# Patient Record
Sex: MALE | Race: WHITE
[De-identification: names, ages, dates, MRNs, and addresses within clinical notes are randomized per-mention and may not be internally consistent; named-entity substitution may affect disease eponyms.]

---

## 2017-01-03 ENCOUNTER — HOSPITAL ENCOUNTER (OUTPATIENT)
Dept: HOSPITAL 45 - C.LABFOXDH | Age: 82
Discharge: HOME | End: 2017-01-03
Attending: INTERNAL MEDICINE
Payer: COMMERCIAL

## 2017-01-03 DIAGNOSIS — I48.2: Primary | ICD-10-CM

## 2017-01-03 LAB
INR PPP: 3.1 (ref 0.9–1.1)
PROTHROMBIN TIME: 34.8 SECONDS (ref 9–12)

## 2017-01-24 ENCOUNTER — HOSPITAL ENCOUNTER (OUTPATIENT)
Dept: HOSPITAL 45 - C.LABFOXDH | Age: 82
Discharge: HOME | End: 2017-01-24
Attending: INTERNAL MEDICINE
Payer: COMMERCIAL

## 2017-01-24 DIAGNOSIS — I48.2: Primary | ICD-10-CM

## 2017-01-24 LAB
INR PPP: 3.7 (ref 0.9–1.1)
PROTHROMBIN TIME: 42.3 SECONDS (ref 9–12)

## 2017-02-07 ENCOUNTER — HOSPITAL ENCOUNTER (OUTPATIENT)
Dept: HOSPITAL 45 - C.LABFOXDH | Age: 82
Discharge: HOME | End: 2017-02-07
Attending: INTERNAL MEDICINE
Payer: COMMERCIAL

## 2017-02-07 DIAGNOSIS — I48.2: Primary | ICD-10-CM

## 2017-02-07 LAB
INR PPP: 3 (ref 0.9–1.1)
PROTHROMBIN TIME: 33.5 SECONDS (ref 9–12)

## 2017-02-28 ENCOUNTER — HOSPITAL ENCOUNTER (OUTPATIENT)
Dept: HOSPITAL 45 - C.LABFOXDH | Age: 82
Discharge: HOME | End: 2017-02-28
Attending: INTERNAL MEDICINE
Payer: COMMERCIAL

## 2017-02-28 DIAGNOSIS — I48.91: Primary | ICD-10-CM

## 2017-02-28 LAB
INR PPP: 2.5 (ref 0.9–1.1)
PROTHROMBIN TIME: 27.5 SECONDS (ref 9–12)

## 2017-03-27 ENCOUNTER — HOSPITAL ENCOUNTER (OUTPATIENT)
Dept: HOSPITAL 45 - C.LABFOXDH | Age: 82
Discharge: HOME | End: 2017-03-27
Attending: INTERNAL MEDICINE
Payer: COMMERCIAL

## 2017-03-27 DIAGNOSIS — I48.91: Primary | ICD-10-CM

## 2017-03-27 LAB
INR PPP: 2.4 (ref 0.9–1.1)
PROTHROMBIN TIME: 27 SECONDS (ref 9–12)

## 2017-04-24 ENCOUNTER — HOSPITAL ENCOUNTER (OUTPATIENT)
Dept: HOSPITAL 45 - C.LABFOXDH | Age: 82
Discharge: HOME | End: 2017-04-24
Attending: INTERNAL MEDICINE
Payer: COMMERCIAL

## 2017-04-24 DIAGNOSIS — I48.91: Primary | ICD-10-CM

## 2017-04-24 LAB
INR PPP: 2.3 (ref 0.9–1.1)
PARTIAL THROMBOPLASTIN RATIO: 1.4
PROTHROMBIN TIME: 25.3 SECONDS (ref 9–12)

## 2017-05-23 ENCOUNTER — HOSPITAL ENCOUNTER (OUTPATIENT)
Dept: HOSPITAL 45 - C.LABFOXDH | Age: 82
Discharge: HOME | End: 2017-05-23
Attending: INTERNAL MEDICINE
Payer: COMMERCIAL

## 2017-05-23 DIAGNOSIS — I48.91: Primary | ICD-10-CM

## 2017-05-23 LAB
ANION GAP SERPL CALC-SCNC: 7 MMOL/L (ref 3–11)
BUN SERPL-MCNC: 28 MG/DL (ref 7–18)
BUN/CREAT SERPL: 20.2 (ref 10–20)
CALCIUM SERPL-MCNC: 8.3 MG/DL (ref 8.5–10.1)
CHLORIDE SERPL-SCNC: 109 MMOL/L (ref 98–107)
CO2 SERPL-SCNC: 27 MMOL/L (ref 21–32)
CREAT SERPL-MCNC: 1.4 MG/DL (ref 0.6–1.4)
GLUCOSE SERPL-MCNC: 84 MG/DL (ref 70–99)
INR PPP: 2.8 (ref 0.9–1.1)
POTASSIUM SERPL-SCNC: 4.6 MMOL/L (ref 3.5–5.1)
PROTHROMBIN TIME: 31.7 SECONDS (ref 9–12)
SODIUM SERPL-SCNC: 143 MMOL/L (ref 136–145)

## 2017-06-19 ENCOUNTER — HOSPITAL ENCOUNTER (OUTPATIENT)
Dept: HOSPITAL 45 - C.LABFOXDH | Age: 82
Discharge: HOME | End: 2017-06-19
Attending: NURSE PRACTITIONER
Payer: COMMERCIAL

## 2017-06-19 DIAGNOSIS — I48.91: Primary | ICD-10-CM

## 2017-06-19 LAB
INR PPP: 2.5 (ref 0.9–1.1)
PROTHROMBIN TIME: 27.7 SECONDS (ref 9–12)

## 2017-07-03 ENCOUNTER — HOSPITAL ENCOUNTER (OUTPATIENT)
Dept: HOSPITAL 45 - C.LABFOXDH | Age: 82
Discharge: HOME | End: 2017-07-03
Attending: NURSE PRACTITIONER
Payer: COMMERCIAL

## 2017-07-03 DIAGNOSIS — I48.2: Primary | ICD-10-CM

## 2017-07-03 LAB
INR PPP: 2.8 (ref 0.9–1.1)
PROTHROMBIN TIME: 31.3 SECONDS (ref 9–12)

## 2017-07-05 NOTE — CODING QUERY NO DIAGNOSIS
SUPPORTING DIAGNOSIS NEEDED





A supporting diagnosis is required for the test/procedure performed on this patient in 
order for us to be reimbursed by the patient's insurance. Please provide a supporting 
diagnosis for the following test/procedure listed below next to the test name along with 
your signature. 



*If there is no additional diagnosis for this patient that would support the following 
test/procedure please document that below next to the test/procedure.



Test(s)/Procedure(s) that require a supporting diagnosis:









* PT/INR                          DIAGNOSIS:









Provider Signature:  ______________________________  Date:  _______



Thank you  

Nida Elkins

[employer]

Phone:  [phone]

Fax:  [phone]



Once completed, please kindly fax back to [phone]



For questions please call [phone]

## 2017-07-11 ENCOUNTER — HOSPITAL ENCOUNTER (OUTPATIENT)
Dept: HOSPITAL 45 - C.LABFOXDH | Age: 82
Discharge: HOME | End: 2017-07-11
Attending: INTERNAL MEDICINE
Payer: COMMERCIAL

## 2017-07-11 DIAGNOSIS — I48.91: Primary | ICD-10-CM

## 2017-07-11 LAB
INR PPP: 2.9 (ref 0.9–1.1)
PROTHROMBIN TIME: 32.6 SECONDS (ref 9–12)

## 2017-07-17 ENCOUNTER — HOSPITAL ENCOUNTER (OUTPATIENT)
Dept: HOSPITAL 45 - C.LABFOXDH | Age: 82
Discharge: HOME | End: 2017-07-17
Attending: INTERNAL MEDICINE
Payer: COMMERCIAL

## 2017-07-17 DIAGNOSIS — I48.91: Primary | ICD-10-CM

## 2017-07-17 LAB
INR PPP: 3.3 (ref 0.9–1.1)
PROTHROMBIN TIME: 36.7 SECONDS (ref 9–12)

## 2017-07-24 ENCOUNTER — HOSPITAL ENCOUNTER (OUTPATIENT)
Dept: HOSPITAL 45 - C.LABFOXDH | Age: 82
Discharge: HOME | End: 2017-07-24
Attending: INTERNAL MEDICINE
Payer: COMMERCIAL

## 2017-07-24 DIAGNOSIS — I48.91: Primary | ICD-10-CM

## 2017-07-24 LAB
INR PPP: 2.5 (ref 0.9–1.1)
PROTHROMBIN TIME: 27.4 SECONDS (ref 9–12)

## 2017-08-07 ENCOUNTER — HOSPITAL ENCOUNTER (OUTPATIENT)
Dept: HOSPITAL 45 - C.LABFOXDH | Age: 82
Discharge: HOME | End: 2017-08-07
Attending: NURSE PRACTITIONER
Payer: COMMERCIAL

## 2017-08-07 DIAGNOSIS — I48.91: Primary | ICD-10-CM

## 2017-08-07 LAB
INR PPP: 3.3 (ref 0.9–1.1)
PROTHROMBIN TIME: 37.4 SECONDS (ref 9–12)

## 2017-08-21 ENCOUNTER — HOSPITAL ENCOUNTER (OUTPATIENT)
Dept: HOSPITAL 45 - C.LABFOXDH | Age: 82
Discharge: HOME | End: 2017-08-21
Attending: INTERNAL MEDICINE
Payer: COMMERCIAL

## 2017-08-21 DIAGNOSIS — I48.91: Primary | ICD-10-CM

## 2017-08-21 LAB
INR PPP: 2.8 (ref 0.9–1.1)
PROTHROMBIN TIME: 31.7 SECONDS (ref 9–12)

## 2017-09-11 ENCOUNTER — HOSPITAL ENCOUNTER (OUTPATIENT)
Dept: HOSPITAL 45 - C.LABFOXDH | Age: 82
Discharge: HOME | End: 2017-09-11
Attending: INTERNAL MEDICINE
Payer: COMMERCIAL

## 2017-09-11 DIAGNOSIS — I48.91: Primary | ICD-10-CM

## 2017-09-11 LAB
INR PPP: 2.5 (ref 0.9–1.1)
PROTHROMBIN TIME: 27.4 SECONDS (ref 9–12)

## 2017-10-02 ENCOUNTER — HOSPITAL ENCOUNTER (OUTPATIENT)
Dept: HOSPITAL 45 - C.LABFOXDH | Age: 82
Discharge: HOME | End: 2017-10-02
Attending: INTERNAL MEDICINE
Payer: COMMERCIAL

## 2017-10-02 DIAGNOSIS — I48.91: Primary | ICD-10-CM

## 2017-10-02 LAB
INR PPP: 3.3 (ref 0.9–1.1)
PROTHROMBIN TIME: 37.5 SECONDS (ref 9–12)

## 2017-10-16 ENCOUNTER — HOSPITAL ENCOUNTER (OUTPATIENT)
Dept: HOSPITAL 45 - C.LABFOXDH | Age: 82
Discharge: HOME | End: 2017-10-16
Attending: INTERNAL MEDICINE
Payer: COMMERCIAL

## 2017-10-16 DIAGNOSIS — I48.91: Primary | ICD-10-CM

## 2017-10-16 LAB
INR PPP: 1.9 (ref 0.9–1.1)
PROTHROMBIN TIME: 20.5 SECONDS (ref 9–12)

## 2017-11-20 ENCOUNTER — HOSPITAL ENCOUNTER (OUTPATIENT)
Dept: HOSPITAL 45 - C.LABFOXDH | Age: 82
Discharge: HOME | End: 2017-11-20
Attending: INTERNAL MEDICINE
Payer: COMMERCIAL

## 2017-11-20 DIAGNOSIS — I48.91: Primary | ICD-10-CM

## 2017-11-20 LAB
INR PPP: 2.3 (ref 0.9–1.1)
PROTHROMBIN TIME: 25.4 SECONDS (ref 9–12)

## 2017-12-18 ENCOUNTER — HOSPITAL ENCOUNTER (OUTPATIENT)
Dept: HOSPITAL 45 - C.LABFOXDH | Age: 82
Discharge: HOME | End: 2017-12-18
Attending: INTERNAL MEDICINE
Payer: COMMERCIAL

## 2017-12-18 DIAGNOSIS — I48.91: Primary | ICD-10-CM

## 2017-12-18 LAB
INR PPP: 2.4 (ref 0.9–1.1)
PROTHROMBIN TIME: 24.8 SECONDS (ref 9–12)

## 2018-01-12 ENCOUNTER — HOSPITAL ENCOUNTER (OUTPATIENT)
Dept: HOSPITAL 45 - C.LABFOXDH | Age: 83
Discharge: HOME | End: 2018-01-12
Attending: INTERNAL MEDICINE
Payer: COMMERCIAL

## 2018-01-12 DIAGNOSIS — Z01.810: Primary | ICD-10-CM

## 2018-01-12 DIAGNOSIS — Z45.010: ICD-10-CM

## 2018-01-12 DIAGNOSIS — I48.2: ICD-10-CM

## 2018-01-12 DIAGNOSIS — I44.2: ICD-10-CM

## 2018-01-12 LAB
ALBUMIN SERPL-MCNC: 3.1 GM/DL (ref 3.4–5)
ALP SERPL-CCNC: 64 U/L (ref 45–117)
ALT SERPL-CCNC: 21 U/L (ref 12–78)
AST SERPL-CCNC: 14 U/L (ref 15–37)
BUN SERPL-MCNC: 24 MG/DL (ref 7–18)
CALCIUM SERPL-MCNC: 8.2 MG/DL (ref 8.5–10.1)
CO2 SERPL-SCNC: 24 MMOL/L (ref 21–32)
CREAT SERPL-MCNC: 1.35 MG/DL (ref 0.6–1.4)
EOSINOPHIL NFR BLD AUTO: 167 K/UL (ref 130–400)
GLUCOSE SERPL-MCNC: 99 MG/DL (ref 70–99)
HCT VFR BLD CALC: 33.9 % (ref 42–52)
HGB BLD-MCNC: 11 G/DL (ref 14–18)
INR PPP: 2.3 (ref 0.9–1.1)
MCH RBC QN AUTO: 30.6 PG (ref 25–34)
MCHC RBC AUTO-ENTMCNC: 32.4 G/DL (ref 32–36)
MCV RBC AUTO: 94.4 FL (ref 80–100)
PMV BLD AUTO: 10.8 FL (ref 7.4–10.4)
POTASSIUM SERPL-SCNC: 4.1 MMOL/L (ref 3.5–5.1)
PROT SERPL-MCNC: 5.7 GM/DL (ref 6.4–8.2)
RED CELL DISTRIBUTION WIDTH CV: 14.9 % (ref 11.5–14.5)
RED CELL DISTRIBUTION WIDTH SD: 51.2 FL (ref 36.4–46.3)
SODIUM SERPL-SCNC: 143 MMOL/L (ref 136–145)
WBC # BLD AUTO: 7.5 K/UL (ref 4.8–10.8)

## 2018-01-15 ENCOUNTER — HOSPITAL ENCOUNTER (OUTPATIENT)
Dept: HOSPITAL 45 - C.LABFOXDH | Age: 83
Discharge: HOME | End: 2018-01-15
Attending: INTERNAL MEDICINE
Payer: COMMERCIAL

## 2018-01-15 DIAGNOSIS — I48.91: Primary | ICD-10-CM

## 2018-01-15 LAB — INR PPP: 2.2 (ref 0.9–1.1)

## 2018-01-16 ENCOUNTER — HOSPITAL ENCOUNTER (OUTPATIENT)
Dept: HOSPITAL 45 - C.ACU | Age: 83
Discharge: HOME | End: 2018-01-16
Attending: INTERNAL MEDICINE
Payer: COMMERCIAL

## 2018-01-16 VITALS
BODY MASS INDEX: 20.15 KG/M2 | BODY MASS INDEX: 20.15 KG/M2 | WEIGHT: 134.48 LBS | HEIGHT: 68.5 IN | WEIGHT: 134.48 LBS | HEIGHT: 68.5 IN

## 2018-01-16 VITALS
DIASTOLIC BLOOD PRESSURE: 78 MMHG | SYSTOLIC BLOOD PRESSURE: 158 MMHG | HEART RATE: 66 BPM | OXYGEN SATURATION: 99 % | TEMPERATURE: 97.34 F

## 2018-01-16 VITALS
SYSTOLIC BLOOD PRESSURE: 150 MMHG | OXYGEN SATURATION: 98 % | DIASTOLIC BLOOD PRESSURE: 73 MMHG | TEMPERATURE: 97.34 F | HEART RATE: 60 BPM

## 2018-01-16 VITALS
OXYGEN SATURATION: 100 % | SYSTOLIC BLOOD PRESSURE: 152 MMHG | TEMPERATURE: 97.34 F | DIASTOLIC BLOOD PRESSURE: 76 MMHG | HEART RATE: 64 BPM

## 2018-01-16 VITALS — HEART RATE: 60 BPM | OXYGEN SATURATION: 98 % | DIASTOLIC BLOOD PRESSURE: 72 MMHG | SYSTOLIC BLOOD PRESSURE: 150 MMHG

## 2018-01-16 DIAGNOSIS — I10: ICD-10-CM

## 2018-01-16 DIAGNOSIS — Z87.891: ICD-10-CM

## 2018-01-16 DIAGNOSIS — I08.0: ICD-10-CM

## 2018-01-16 DIAGNOSIS — I48.2: ICD-10-CM

## 2018-01-16 DIAGNOSIS — Z79.82: ICD-10-CM

## 2018-01-16 DIAGNOSIS — E78.5: ICD-10-CM

## 2018-01-16 DIAGNOSIS — I44.2: Primary | ICD-10-CM

## 2018-01-16 DIAGNOSIS — Z79.01: ICD-10-CM

## 2018-01-16 DIAGNOSIS — Z79.899: ICD-10-CM

## 2018-01-16 DIAGNOSIS — Z95.0: ICD-10-CM

## 2018-01-16 NOTE — POST SEDATION ASSESSMENT
Post Sedation Assessment


General


Date of Sedation


Jan 16, 2018.


Vital Signs:





Vital Signs Past 12 Hours








  Date Time  Temp Pulse Resp B/P (MAP) Pulse Ox O2 Delivery O2 Flow Rate FiO2


 


1/16/18 12:45  69 16 130/70 (90) 97 Room Air  


 


1/16/18 12:40  72 16 117/67 (84) 97 Room Air  


 


1/16/18 12:35  69 16 119/67 (84) 97 Room Air  


 


1/16/18 12:30  79 16 125/69 (87) 98 Room Air  


 


1/16/18 10:31 36.3 66 20 158/78 (104) 99 Room Air  











Post Procedure Recovery Score


Activity:  (2) Moves 4 extremities *


Respiration:  (2) Deep breath/cough


Circulation:  (2) +/-20% PreAnes Value


Consciousness:  (2) Fully Awake


Oxygen Saturation:  (2) > 92% On Room Air


Post Anesthesia Score:  10





Discharge Sedation


Level of Care:  Fast Track Phase II





Post Sedation Plan


On clinical assessment, the patient appears to have tolerated the sedation 

without complications. Patient is recovering as anticipated.





Patient will continue to be monitored by nursing and may be discharged when 

sedation discharge criteria are met per below protocol. 





Upon Completions of procedure and additional 15 minutes continue every 5 minute 

vital signs and the P.A.R. score; then discharge to a Phase I or Fast Track to 

Phase II per the following guidelines:





* Discharge Patient to appropriate Phase II area if PAR is 8 or greater or 

return to pre- procedure baseline.  The post - procedure orders will be as 

directed. 





* If PAR score is less than 8 or not return to pre-procedure baseline then 

patient will follow Phase I monitoring till PAR is reached for Phase II.  The 

Phase I may be done in procedure room or may call to secure a Phase I area.





* If naloxone or flumazenil are used for reversal, hold in Phase I for an 

additional 60 -120 minutes before discharge to Phase II.  Please call the 

Sedation Physician to re-evaluate and complete post-note for discharge to Phase 

II area. 





Do NOT discharge from procedure sedation or Phase 1 until post- sedation 

evaluation note is complete by procedure /sedation MD





Sedation Discharge Instructions to be given to the patient at discharge to home.

## 2018-01-16 NOTE — PRE SEDATION ASSESSMENT
Pre Sedation Assessment


General


Date of Sedation:


Jan 16, 2018.





Vital Signs Past 12 Hours








  Date Time  Temp Pulse Resp B/P (MAP) Pulse Ox O2 Delivery O2 Flow Rate FiO2


 


1/16/18 10:31 36.3 66 20 158/78 (104) 99 Room Air  











Review


Cardiovascular:  regular rate, rhythm


Lungs:  lungs clear





Pre-Sedation Airway Assessment


Smoking Status:  Former Smoker


Hx of Sleep Apnea:  No


Hx of difficult intubation:  No


Short Thick Neck:  No


Thyro-mental Distance:  < or =3 Finger Breadths


Oral Cavity:  Dentures


Mallampati Classification:  Class II


ASA Classification:  Class II





Procedure Planning


Contraindications for Sedation:  None


Current Medications Reviewed:  Yes





Notes








The planned sedation has been discussed with the patient. Informed Consent was 

obtained.  I have identified the patient, determined the appropriateness of 

sedation and have assessed the patient immediately prior to the procedure.  





All medicine(s) and interventions are by my order.

## 2018-01-16 NOTE — DISCHARGE INSTRUCTIONS
Discharge Instructions


Date of Service


Jan 16, 2018.





Visit


Reason for Visit:  Pacemaker Battery Depletion





Discharge


Discharge Diagnosis / Problem:  pacemaker at [facility], chb





Discharge Goals


Goal(s):  Improve function





Activity Recommendations


Activity Limitations:  as noted below


Lifting Limitations:  no more than 10 pounds (do not lift more than 10 pounds 

with the left arm for 2 weeks)


Shower/Bathe:  tomorrow


Driving or Machine Use:  resume 1 day after discharge





Anesthesia


.





Post Anesthesia Instructions:





If you have had General Anesthesia or IV Sedation:





*  Do not drive today.


*  Resume driving when surgeon permits.


*  Do not make important decisions or sign legal documents today.


*  Call surgeon for:





   1.  Temperature elevations greater than 101 degrees F.


   2.  Uncontrollable pain.


   3.  Excessive bleeding.


   4.  Persistent nausea and vomiting.


   5.  Medication intolerance (nausea, vomiting or rash).





*  For nausea and vomiting use only clear liquids such as: tea, soda, bouillon 

until nausea subsides, then gradually increase diet as tolerated.





*  If you have any concerns or questions, call your surgeon's office.  If 

physician is unavailable and it is an emergency, call 911 or go to the nearest 

emergency room.





.





Instructions / Follow-Up


Instructions / Follow-Up


ACTIVITY RECOMMENDATIONS:





* Do not lift more than 10 pounds with the left arm for 2 weeks








SPECIAL CARE INSTRUCTIONS:





*  If bleeding occurs, apply direct pressure to area for 5 minutes.





*  Call your doctor if you have severe pain, fever, drainage or bleeding at 

site.





*  Keep dressing on and dry for 24 hours then remove.





*  Keep any scheduled doctor's appointment.





*  Implant Card - hand held device with website information given.





SKIN IRRITATION:





*  You may experience some redness and/or swelling in the area where radiation 

was


   administered.  If any skin irritation occurs, please contact your family 

physician.








FOLLOW UP VISIT:





Keep any scheduled doctor appointments.





Diet Recommendations


Recommended Home Diet:  resume previous diet





Procedures


Procedures Performed:  


dual chamber rate responsive pacemaker generator change





Pending Studies


Studies pending at discharge:  no





Medical Emergencies








.


Who to Call and When:





Medical Emergencies:  If at any time you feel your situation is an emergency, 

please call 911 immediately.





.





Non-Emergent Contact


Non-Emergency issues call your:  Cardiologist





.


.








"Provider Documentation" section prepared by Keara Bajwa.








.

## 2018-01-16 NOTE — [facility] POST OPERATIVE BRIEF NOTE
Immediate Operative Summary


Operative Date


Jan 16, 2018.





Pre-Operative Diagnosis





ppm at [place], chb





Post-Operative Diagnosis





same





Procedure(s) Performed





dual chamber rate responsive pacemaker generator change





Surgeon


smooth saeed





Assistant Surgeon(s)


none





Estimated Blood Loss


<5cc





Findings


see official report





Fluids (cc crystalloids)


100





Specimens





none





Drains


none





Anesthesia


3mg versed and 50mcg fentatnyl





Complication(s)


None





Disposition


asu/mtu

## 2018-01-16 NOTE — OPERATIVE REPORT
DATE OF OPERATION:  2018

 

PREOPERATIVE DIAGNOSIS:  Complete heart block, pacemaker at elective

replacement indicator.

 

POSTOPERATIVE DIAGNOSIS:  Same.

 

PROCEDURE:  Dual chamber rate responsive permanent pacemaker generator

change.  

 

SURGEON:  Dr. Keara Bajwa.  

 

ASSISTANT:  None.

 

ANESTHESIA:  Monitored conscious sedation administered under my supervision

by Jose Escobar.  Start time 12:10, end time 12:30.  A total of 3 mg of

Versed, 50 mcg of fentanyl.

 

INTRAVENOUS FLUIDS:  100 mL.

 

BLOOD LOSS:  Less than 5  mL.

 

COMPLICATIONS:  None.

 

CONDITION:  Stable.

 

URINE OUTPUT:  Not applicable.

 

SPECIMENS:  None.

 

FINDINGS:  None.

 

DRAINS:  None.  

 

INDICATIONS:  This is an 88-year-old gentleman who has a past medical history

for complete heart block in which he underwent a permanent pacemaker in the

remote past, permanent atrial fibrillation on Coumadin and Toprol, 

CHADS2-VASc score of 3, mitral regurgitation, aortic stenosis, hypertension,

hyperlipidemia, BPH.  At his most recent device check he was  found to hit

S[facility] and was recommended a generator change.

 

CONSENT:  Consent was obtained prior to the patient going into the

electrophysiology lab.  The patient was informed of the risks,  benefits and

alternatives to the procedure.  Risks include but not limited to sudden

cardiac death, cardiac arrhythmias, cerebrovascular accident, myocardial

infarction, bleeding and infection.  The patient understood these risks and

agreed to undergo the procedure as planned.  Informed consent was obtained.  

 

DESCRIPTION OF THE PROCEDURE:  The patient was brought into the

electrophysiology lab in a fasting state.  He was connected to continuous

cardiac monitoring.  A timeout was performed to ensure patient's  identity

and procedure correctly.  The patient received prophylactic antibiotics prior

to incision.  He was prepped and draped over the left infraclavicular space

in  normal surgical standard fashion.  Monitored conscious sedation was given

throughout the procedure for patient's comfort level under my supervision. 

Universal precautions were maintained throughout the procedure.

 

Ten mL of 1% lidocaine, bupivacaine mixture were given over the device area. 

Incision was made over the device and blunt dissection was performed down to

the capsule.  The capsule was disrupted and the device was freed from the

capsule and removed from the body.  The leads were tested intraoperatively,

see below for results.  The pocket was disrupted inferiorly and caudally to

allow for new blood flow.  The pocket was then flushed with copious amounts

of bacitracin saline wash and inspected for hemostasis.  The new leads were

attached to the new pulse generator making sure that the pins were

appropriate, passed the  set screws and set screws were all tightened.  Pulse

generator was then  placed back in the pocket, making sure that the leads

were lying flat beneath the device.  The incision was closed in a 3-layer

fashion using 2-0 Vicryl interrupted suture followed by 3-0 Vicryl

interrupted suture followed by a 4-0 Monocryl stitch and Dermabond was

applied.  

 

EQUIPMENT:

1. The new pulse generator was a [employer] MRI SureScan A2DR01, 

serial #[id number].  .

2. Right atrial lead model #5076, serial #[id number].

3. Right ventricular lead model #5076, serial #[id number].

 

INTRAOPERATIVE TESTIN. Right atrial lead fib waves are 0.6-0.8 millivolts.  

2. Right ventricular lead no R-waves as the patient has complete heart block.

 Impedance 395 ohms, threshold 0.6 volts at 1.3 milliamps.

 

FINAL MEASUREMENTS THROUGH THE DEVICE:

1. Right atrial lead fib waves 0.8 millivolts, impedance 475 ohms, no

threshold testing as patient was in atrial fibrillation.

2. Right ventricular lead:  R-wave  6.4 millivolts, I believe this was a PVC,

impedance 380 ohms, threshold 0.75 at 0.4 milliseconds.

 

FINAL PARAMETERS:  VVIR 60/120.  Right atrial amplitude 2 volts, pulse width

0.4 milliseconds, sensitivity 0.9 millivolts.

 

IMPRESSION:  Successful dual chamber rate responsive permanent pacemaker

generator change secondary to complete heart block and pacemaker SARAH.

 

PLAN:  Monitor patient post sedation.  He can continue his home medications. 

He should not lift more than 10 pounds with the left arm for 2 weeks.  He can

remove the pressure dressing tomorrow and shower tomorrow.  He should follow

up in our [facility] office for device and wound check in 7-10 days.

 

 

I attest to the content of the Intraoperative Record and any orders documented 
therein. Any exceptions are noted below.

 

 

 

MELANIE

## 2018-02-12 ENCOUNTER — HOSPITAL ENCOUNTER (OUTPATIENT)
Dept: HOSPITAL 45 - C.LABFOXDH | Age: 83
Discharge: HOME | End: 2018-02-12
Attending: INTERNAL MEDICINE
Payer: COMMERCIAL

## 2018-02-12 DIAGNOSIS — I48.91: Primary | ICD-10-CM

## 2018-02-12 LAB — INR PPP: 2 (ref 0.9–1.1)

## 2018-03-12 ENCOUNTER — HOSPITAL ENCOUNTER (OUTPATIENT)
Dept: HOSPITAL 45 - C.LABFOXDH | Age: 83
Discharge: HOME | End: 2018-03-12
Attending: INTERNAL MEDICINE
Payer: COMMERCIAL

## 2018-03-12 DIAGNOSIS — I48.91: Primary | ICD-10-CM

## 2018-03-12 LAB — INR PPP: 2.2 (ref 0.9–1.1)

## 2018-04-09 ENCOUNTER — HOSPITAL ENCOUNTER (OUTPATIENT)
Dept: HOSPITAL 45 - C.LABFOXDH | Age: 83
Discharge: HOME | End: 2018-04-09
Attending: INTERNAL MEDICINE
Payer: COMMERCIAL

## 2018-04-09 DIAGNOSIS — I48.91: Primary | ICD-10-CM

## 2018-04-09 LAB — INR PPP: 2.6 (ref 0.9–1.1)

## 2018-05-07 ENCOUNTER — HOSPITAL ENCOUNTER (OUTPATIENT)
Dept: HOSPITAL 45 - C.LABFOXDH | Age: 83
Discharge: HOME | End: 2018-05-07
Attending: INTERNAL MEDICINE
Payer: COMMERCIAL

## 2018-05-07 DIAGNOSIS — I48.91: Primary | ICD-10-CM

## 2018-05-07 LAB — INR PPP: 2.2 (ref 0.9–1.1)

## 2018-07-19 ENCOUNTER — HOSPITAL ENCOUNTER (OUTPATIENT)
Dept: HOSPITAL 45 - C.LABFOXDH | Age: 83
Discharge: HOME | End: 2018-07-19
Attending: INTERNAL MEDICINE
Payer: COMMERCIAL

## 2018-07-19 DIAGNOSIS — I48.91: Primary | ICD-10-CM

## 2018-07-19 LAB
BUN SERPL-MCNC: 23 MG/DL (ref 7–18)
CALCIUM SERPL-MCNC: 8.3 MG/DL (ref 8.5–10.1)
CO2 SERPL-SCNC: 25 MMOL/L (ref 21–32)
CREAT SERPL-MCNC: 1.31 MG/DL (ref 0.6–1.4)
EOSINOPHIL NFR BLD AUTO: 206 K/UL (ref 130–400)
GLUCOSE SERPL-MCNC: 70 MG/DL (ref 70–99)
HCT VFR BLD CALC: 37 % (ref 42–52)
HGB BLD-MCNC: 12.2 G/DL (ref 14–18)
INR PPP: 1.6 (ref 0.9–1.1)
MCH RBC QN AUTO: 30.2 PG (ref 25–34)
MCHC RBC AUTO-ENTMCNC: 33 G/DL (ref 32–36)
MCV RBC AUTO: 91.6 FL (ref 80–100)
PMV BLD AUTO: 10 FL (ref 7.4–10.4)
POTASSIUM SERPL-SCNC: 4.5 MMOL/L (ref 3.5–5.1)
RED CELL DISTRIBUTION WIDTH CV: 15.4 % (ref 11.5–14.5)
RED CELL DISTRIBUTION WIDTH SD: 51.6 FL (ref 36.4–46.3)
SODIUM SERPL-SCNC: 137 MMOL/L (ref 136–145)
WBC # BLD AUTO: 6.53 K/UL (ref 4.8–10.8)

## 2018-07-24 ENCOUNTER — HOSPITAL ENCOUNTER (OUTPATIENT)
Dept: HOSPITAL 45 - C.LABFOXDH | Age: 83
Discharge: HOME | End: 2018-07-24
Attending: INTERNAL MEDICINE
Payer: COMMERCIAL

## 2018-07-24 DIAGNOSIS — I48.91: Primary | ICD-10-CM

## 2018-07-24 DIAGNOSIS — N18.3: ICD-10-CM

## 2018-07-24 LAB
BUN SERPL-MCNC: 25 MG/DL (ref 7–18)
CALCIUM SERPL-MCNC: 8.2 MG/DL (ref 8.5–10.1)
CO2 SERPL-SCNC: 27 MMOL/L (ref 21–32)
CREAT SERPL-MCNC: 1.28 MG/DL (ref 0.6–1.4)
GLUCOSE SERPL-MCNC: 69 MG/DL (ref 70–99)
INR PPP: 1.7 (ref 0.9–1.1)
POTASSIUM SERPL-SCNC: 4.3 MMOL/L (ref 3.5–5.1)
SODIUM SERPL-SCNC: 142 MMOL/L (ref 136–145)
URATE SERPL-MCNC: 9.9 MG/DL (ref 2.6–7.2)

## 2018-07-31 ENCOUNTER — HOSPITAL ENCOUNTER (OUTPATIENT)
Dept: HOSPITAL 45 - C.LABFOXDH | Age: 83
Discharge: HOME | End: 2018-07-31
Attending: INTERNAL MEDICINE
Payer: COMMERCIAL

## 2018-07-31 DIAGNOSIS — I48.91: Primary | ICD-10-CM

## 2018-07-31 LAB — INR PPP: 2.1 (ref 0.9–1.1)

## 2018-08-02 NOTE — CODING QUERY NO DIAGNOSIS
***Valid Physician Order Needed***



A valid physician order must be submitted in order to properly bill for the service(s) 
provided, including date of service(s), valid diagnosis, and physician signature. If these 
tests are done on a recurring basis the original physician order must be submitted in 
order to code and bill for the service(s) provided.



****Please fax us the original, signed physician order so that we may expedite billing to 
[phone]





DOS 7/24/18 (Attached order is missing signature)

* Partial Renal Profile

* Uric Acid

* Prothrombin Time Profile















Thank you  

Gloria Murphy

[employer]

Phone:  [phone]

Fax:  [phone]

## 2018-08-07 ENCOUNTER — HOSPITAL ENCOUNTER (OUTPATIENT)
Dept: HOSPITAL 45 - C.LABFOXDH | Age: 83
Discharge: HOME | End: 2018-08-07
Attending: INTERNAL MEDICINE
Payer: COMMERCIAL

## 2018-08-07 ENCOUNTER — HOSPITAL ENCOUNTER (OUTPATIENT)
Dept: HOSPITAL 45 - C.RDSM | Age: 83
Discharge: HOME | End: 2018-08-07
Attending: ORTHOPAEDIC SURGERY
Payer: COMMERCIAL

## 2018-08-07 DIAGNOSIS — I48.91: Primary | ICD-10-CM

## 2018-08-07 DIAGNOSIS — M79.675: Primary | ICD-10-CM

## 2018-08-07 LAB — INR PPP: 3.5 (ref 0.9–1.1)

## 2018-08-14 ENCOUNTER — HOSPITAL ENCOUNTER (OUTPATIENT)
Dept: HOSPITAL 45 - C.LABFOXDH | Age: 83
Discharge: HOME | End: 2018-08-14
Attending: INTERNAL MEDICINE
Payer: COMMERCIAL

## 2018-08-14 DIAGNOSIS — I48.91: Primary | ICD-10-CM

## 2018-08-14 LAB — INR PPP: 3.6 (ref 0.9–1.1)

## 2018-08-15 ENCOUNTER — HOSPITAL ENCOUNTER (OUTPATIENT)
Dept: HOSPITAL 45 - C.LABFOXDH | Age: 83
Discharge: HOME | End: 2018-08-15
Attending: PHYSICIAN ASSISTANT
Payer: COMMERCIAL

## 2018-08-15 DIAGNOSIS — Z01.818: Primary | ICD-10-CM

## 2018-08-15 DIAGNOSIS — M86.9: ICD-10-CM

## 2018-08-16 ENCOUNTER — HOSPITAL ENCOUNTER (OUTPATIENT)
Dept: HOSPITAL 45 - C.LABFOXDH | Age: 83
Discharge: HOME | End: 2018-08-16
Attending: NURSE PRACTITIONER
Payer: COMMERCIAL

## 2018-08-16 DIAGNOSIS — I10: Primary | ICD-10-CM

## 2018-08-16 LAB
BUN SERPL-MCNC: 35 MG/DL (ref 7–18)
CALCIUM SERPL-MCNC: 8.3 MG/DL (ref 8.5–10.1)
CO2 SERPL-SCNC: 28 MMOL/L (ref 21–32)
CREAT SERPL-MCNC: 1.32 MG/DL (ref 0.6–1.4)
GLUCOSE SERPL-MCNC: 71 MG/DL (ref 70–99)
POTASSIUM SERPL-SCNC: 4.4 MMOL/L (ref 3.5–5.1)
SODIUM SERPL-SCNC: 138 MMOL/L (ref 136–145)

## 2018-08-21 ENCOUNTER — HOSPITAL ENCOUNTER (OUTPATIENT)
Dept: HOSPITAL 45 - C.LABFOXDH | Age: 83
Discharge: HOME | End: 2018-08-21
Attending: INTERNAL MEDICINE
Payer: COMMERCIAL

## 2018-08-21 DIAGNOSIS — I48.91: Primary | ICD-10-CM

## 2018-08-21 LAB — INR PPP: 1.4 (ref 0.9–1.1)

## 2018-08-27 ENCOUNTER — HOSPITAL ENCOUNTER (OUTPATIENT)
Dept: HOSPITAL 45 - C.LABFOXDH | Age: 83
Discharge: HOME | End: 2018-08-27
Attending: INTERNAL MEDICINE
Payer: COMMERCIAL

## 2018-08-27 DIAGNOSIS — I48.91: Primary | ICD-10-CM

## 2018-08-27 LAB — INR PPP: 1.2 (ref 0.9–1.1)
